# Patient Record
Sex: MALE | Race: WHITE | Employment: FULL TIME | URBAN - METROPOLITAN AREA
[De-identification: names, ages, dates, MRNs, and addresses within clinical notes are randomized per-mention and may not be internally consistent; named-entity substitution may affect disease eponyms.]

---

## 2020-10-23 ENCOUNTER — APPOINTMENT (OUTPATIENT)
Dept: GENERAL RADIOLOGY | Facility: CLINIC | Age: 55
End: 2020-10-23
Attending: NURSE PRACTITIONER
Payer: COMMERCIAL

## 2020-10-23 ENCOUNTER — HOSPITAL ENCOUNTER (EMERGENCY)
Facility: CLINIC | Age: 55
Discharge: HOME | End: 2020-10-23
Attending: NURSE PRACTITIONER | Admitting: NURSE PRACTITIONER
Payer: COMMERCIAL

## 2020-10-23 VITALS
OXYGEN SATURATION: 100 % | HEART RATE: 70 BPM | DIASTOLIC BLOOD PRESSURE: 85 MMHG | RESPIRATION RATE: 16 BRPM | TEMPERATURE: 97.9 F | SYSTOLIC BLOOD PRESSURE: 147 MMHG

## 2020-10-23 DIAGNOSIS — M25.569 KNEE PAIN: ICD-10-CM
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
## 2020-10-23 PROCEDURE — 73562 X-RAY EXAM OF KNEE 3: CPT | Mod: LT

## 2020-10-23 PROCEDURE — 250N000013 HC RX MED GY IP 250 OP 250 PS 637: Performed by: NURSE PRACTITIONER

## 2020-10-23 PROCEDURE — 99285 EMERGENCY DEPT VISIT HI MDM: CPT

## 2020-10-23 RX ORDER — IBUPROFEN 600 MG/1
600 TABLET, FILM COATED ORAL ONCE
Status: COMPLETED | OUTPATIENT
Start: 2020-10-23 | End: 2020-10-23

## 2020-10-23 RX ADMIN — IBUPROFEN 600 MG: 600 TABLET, FILM COATED ORAL at 17:32

## 2020-10-23 ASSESSMENT — ENCOUNTER SYMPTOMS
DIZZINESS: 0
NUMBNESS: 0
SHORTNESS OF BREATH: 0
ARTHRALGIAS: 1
WEAKNESS: 0
WOUND: 0
LIGHT-HEADEDNESS: 0
CHILLS: 0
FEVER: 0
COUGH: 0

## 2020-10-23 NOTE — ED AVS SNAPSHOT
[facility]  [address]  [facility] [address]  Phone: [phone]  Fax: [phone]                                    Luis Alberto Santos   MRN: [id number]    Department: [facility]   Date of Visit: 10/23/2020           After Visit Summary Signature Page    I have received my discharge instructions, and my questions have been answered. I have discussed any challenges I see with this plan with the nurse or doctor.    ..........................................................................................................................................  Patient/Patient Representative Signature      ..........................................................................................................................................  Patient Representative Print Name and Relationship to Patient    ..................................................               ................................................  Date                                   Time    ..........................................................................................................................................  Reviewed by Signature/Title    ...................................................              ..............................................  Date                                               Time          22EPIC Rev 08/18

## 2020-10-23 NOTE — ED TRIAGE NOTES
Pt here for L knee pain after a slip and fall in the snow. CMS intact. A+Ox4, no acute signs of distress.

## 2020-10-23 NOTE — ED PROVIDER NOTES
History     Chief Complaint:  Knee Pain    The history is provided by the patient.     Luis Alberto Santos is a 55 year old year old male with a history of high-functioning autism, hyperlipidemia, hypertension, and thyroid disease, who presents via EMS for evaluation of left kneecap pain after slipping and falling directly on the curb just prior to arrival. Denies hitting his head or losing consciousness. Able to get up but has pain with walking which is why EMS was called who transferred him to the ED.    Denies any thigh pain, calf pain, pain behind his knee, or on the medial or lateral aspects of his knee.     Allergies:  No Known Allergies    Medications:   Does not know name of medications - but taking medications for hypertension, hyperlipidemia, and a thyroid disease.     Medical History:   Hypertension  Hyperlipidemia  Thyroid disease  High-functioning autism     Surgical History   The patient does not have any pertinent past surgical history.    Family History:   No past pertinent family history.    Social History:  Smoking status: Negative  Alcohol use: Negative  Drug use: Negative  Marital Status: Single  Presents to the ED via EMS.    Review of Systems   Constitutional: Negative for chills and fever.   Respiratory: Negative for cough and shortness of breath.    Musculoskeletal: Positive for arthralgias (left knee cap) and gait problem (secondary to pain).   Skin: Negative for wound.   Neurological: Negative for dizziness, weakness, light-headedness and numbness.   All other systems reviewed and are negative.      Physical Exam     Patient Vitals for the past 24 hrs:   BP Temp Temp src Pulse Resp SpO2   10/23/20 1745 -- -- -- -- -- 100 %   10/23/20 1730 (!) 147/85 -- -- 70 -- 100 %   10/23/20 1722 (!) 148/93 97.9  F (36.6  C) Axillary 79 16 99 %       Physical Exam  General: Alert, No obvious discomfort, well kept   HENT:  Normal voice, No lymphadenopathy  Eyes:  The pupils are equal, round, and reactive  to light, Conjunctiva normal, No scleral icterus   Neck:  Normal range of motion  CV:  Normal Pulses  Resp:  Non-labored, No cough  MS:  Normal muscular tone, moves all extremities, Left knee with no significant tenderness, no obvious deformity and no crepitus.  Skin:  No rash or acute skin lesions noted  Neuro: Speech is normal and fluent, baseline mental status  Psych:  Awake. Alert.  Normal affect.  Appropriate interactions. Good eye contact    Emergency Department Course   Imaging:  Radiology findings were communicated with the patient and family who voiced understanding of the findings.    XR Knee, Left, G/E 3 views:   Advanced degenerative narrowing of the patellofemoral compartment. Additional degenerative change within the medial and lateral compartments with osteophytic spurring medial and lateral joint line. Chronic enthesitis at the quadriceps attachment to the superior pole of the patella and chronic appearing fragmentation of the anterior tibial tubercle consistent with old fracture or Osgood-Schlatter's disease. No evidence for acute fracture but there is a knee joint effusion. No evidence for lipohemarthrosis. As per radiology.     Interventions:  1732 Ibuprofen 600 mg PO    Emergency Department Course:  Past medical records, nursing notes, and vitals reviewed.    5:19 PM I performed an exam of the patient as documented above.     The patient was sent for a left knee x-ray while in the emergency department, results above.     1810 I rechecked the patient and discussed the results of his workup thus far.     Findings and plan explained to the Patient and brother. Patient discharged home with instructions regarding supportive care, medications, and reasons to return. The importance of close follow-up was reviewed.     I personally reviewed the imaging results with the Patient and answered all related questions prior to discharge.     Impression & Plan   Medical Decision Making:  Luis Alberto Santos is a  55 year old male  who presents for evaluation of left knee pain.  His exam findings are noted above, most pertinent is no redness, warmth to knee making septic arthritis and/or crystal disease of joint very unlikely.  Signs and symptoms are consistent with a knee sprain.  A broad differential was also considered including sprain, strain, fracture, tendon rupture, nerve impingement/compromise, referred pain. Supportive outpatient management is indicated.  Rest, ice, and elevation treatment was discussed with the patient. The patients head to toe trauma exam is otherwise negative for serious underlying disease of the head, neck, chest, abdomen, extremities, pelvis.  Close follow-up with patient's primary care physician per discharge precautions. Contusion discharge instructions given for home.       Diagnosis:    ICD-10-CM    1. Knee pain  M25.569    2. Fall, initial encounter  W19.XXXA        Disposition:  Discharged to home.    Scribe Disclosure:  IAislinn, am serving as a scribe on 10/23/2020 at 5:21 PM to personally document services performed by Bola Pascual APRN* based on my observations and the provider's statements to me.      Bola Pascual APRN CNP  10/23/20 2024